# Patient Record
Sex: MALE | Race: BLACK OR AFRICAN AMERICAN | Employment: UNEMPLOYED | URBAN - METROPOLITAN AREA
[De-identification: names, ages, dates, MRNs, and addresses within clinical notes are randomized per-mention and may not be internally consistent; named-entity substitution may affect disease eponyms.]

---

## 2024-03-02 ENCOUNTER — HOSPITAL ENCOUNTER (INPATIENT)
Facility: HOSPITAL | Age: 24
LOS: 4 days | Discharge: HOME | DRG: 885 | End: 2024-03-06
Attending: PSYCHIATRY & NEUROLOGY | Admitting: PSYCHIATRY & NEUROLOGY

## 2024-03-02 ENCOUNTER — HOSPITAL ENCOUNTER (EMERGENCY)
Facility: HOSPITAL | Age: 24
Discharge: ANOTHER HOSPITAL | End: 2024-03-02
Attending: EMERGENCY MEDICINE

## 2024-03-02 VITALS
DIASTOLIC BLOOD PRESSURE: 90 MMHG | HEIGHT: 67 IN | HEART RATE: 57 BPM | TEMPERATURE: 97.7 F | SYSTOLIC BLOOD PRESSURE: 135 MMHG | WEIGHT: 119.05 LBS | BODY MASS INDEX: 18.69 KG/M2 | RESPIRATION RATE: 16 BRPM | OXYGEN SATURATION: 100 %

## 2024-03-02 DIAGNOSIS — F48.9 MENTAL HEALTH PROBLEM: Primary | ICD-10-CM

## 2024-03-02 PROBLEM — F31.9 BIPOLAR DISORDER (HCC): Status: ACTIVE | Noted: 2024-03-02

## 2024-03-02 LAB
ALBUMIN SERPL-MCNC: 4.3 G/DL (ref 3.5–5)
ALBUMIN/GLOB SERPL: 1.2 (ref 1.1–2.2)
ALP SERPL-CCNC: 110 U/L (ref 45–117)
ALT SERPL-CCNC: 22 U/L (ref 12–78)
AMPHET UR QL SCN: NEGATIVE
ANION GAP SERPL CALC-SCNC: 6 MMOL/L (ref 5–15)
APAP SERPL-MCNC: <2 UG/ML (ref 10–30)
APPEARANCE UR: CLEAR
AST SERPL-CCNC: 22 U/L (ref 15–37)
BACTERIA URNS QL MICRO: NEGATIVE /HPF
BARBITURATES UR QL SCN: NEGATIVE
BASOPHILS # BLD: 0 K/UL (ref 0–0.1)
BASOPHILS NFR BLD: 1 % (ref 0–1)
BENZODIAZ UR QL: NEGATIVE
BILIRUB SERPL-MCNC: 0.8 MG/DL (ref 0.2–1)
BILIRUB UR QL: NEGATIVE
BUN SERPL-MCNC: 6 MG/DL (ref 6–20)
BUN/CREAT SERPL: 8 (ref 12–20)
CALCIUM SERPL-MCNC: 9.5 MG/DL (ref 8.5–10.1)
CANNABINOIDS UR QL SCN: POSITIVE
CHLORIDE SERPL-SCNC: 101 MMOL/L (ref 97–108)
CO2 SERPL-SCNC: 28 MMOL/L (ref 21–32)
COCAINE UR QL SCN: NEGATIVE
COLOR UR: NORMAL
CREAT SERPL-MCNC: 0.79 MG/DL (ref 0.7–1.3)
DIFFERENTIAL METHOD BLD: NORMAL
EOSINOPHIL # BLD: 0 K/UL (ref 0–0.4)
EOSINOPHIL NFR BLD: 1 % (ref 0–7)
EPITH CASTS URNS QL MICRO: NORMAL /LPF
ERYTHROCYTE [DISTWIDTH] IN BLOOD BY AUTOMATED COUNT: 12.4 % (ref 11.5–14.5)
ETHANOL SERPL-MCNC: <10 MG/DL (ref 0–0.08)
FLUAV RNA SPEC QL NAA+PROBE: NOT DETECTED
FLUBV RNA SPEC QL NAA+PROBE: NOT DETECTED
GLOBULIN SER CALC-MCNC: 3.7 G/DL (ref 2–4)
GLUCOSE SERPL-MCNC: 76 MG/DL (ref 65–100)
GLUCOSE UR STRIP.AUTO-MCNC: NEGATIVE MG/DL
HCT VFR BLD AUTO: 39.7 % (ref 36.6–50.3)
HGB BLD-MCNC: 13.3 G/DL (ref 12.1–17)
HGB UR QL STRIP: NEGATIVE
HYALINE CASTS URNS QL MICRO: NORMAL /LPF (ref 0–2)
IMM GRANULOCYTES # BLD AUTO: 0 K/UL (ref 0–0.04)
IMM GRANULOCYTES NFR BLD AUTO: 0 % (ref 0–0.5)
KETONES UR QL STRIP.AUTO: NEGATIVE MG/DL
LEUKOCYTE ESTERASE UR QL STRIP.AUTO: NEGATIVE
LYMPHOCYTES # BLD: 1.4 K/UL (ref 0.8–3.5)
LYMPHOCYTES NFR BLD: 19 % (ref 12–49)
Lab: ABNORMAL
MCH RBC QN AUTO: 29.2 PG (ref 26–34)
MCHC RBC AUTO-ENTMCNC: 33.5 G/DL (ref 30–36.5)
MCV RBC AUTO: 87.3 FL (ref 80–99)
METHADONE UR QL: NEGATIVE
MONOCYTES # BLD: 1 K/UL (ref 0–1)
MONOCYTES NFR BLD: 13 % (ref 5–13)
NEUTS SEG # BLD: 5.1 K/UL (ref 1.8–8)
NEUTS SEG NFR BLD: 66 % (ref 32–75)
NITRITE UR QL STRIP.AUTO: NEGATIVE
NRBC # BLD: 0 K/UL (ref 0–0.01)
NRBC BLD-RTO: 0 PER 100 WBC
OPIATES UR QL: NEGATIVE
PCP UR QL: NEGATIVE
PH UR STRIP: 7 (ref 5–8)
PLATELET # BLD AUTO: 370 K/UL (ref 150–400)
PMV BLD AUTO: 8.9 FL (ref 8.9–12.9)
POTASSIUM SERPL-SCNC: 4 MMOL/L (ref 3.5–5.1)
PROT SERPL-MCNC: 8 G/DL (ref 6.4–8.2)
PROT UR STRIP-MCNC: NEGATIVE MG/DL
RBC # BLD AUTO: 4.55 M/UL (ref 4.1–5.7)
RBC #/AREA URNS HPF: NORMAL /HPF (ref 0–5)
SALICYLATES SERPL-MCNC: <1.7 MG/DL (ref 2.8–20)
SARS-COV-2 RNA RESP QL NAA+PROBE: NOT DETECTED
SODIUM SERPL-SCNC: 135 MMOL/L (ref 136–145)
SP GR UR REFRACTOMETRY: <1.005
URINE CULTURE IF INDICATED: NORMAL
UROBILINOGEN UR QL STRIP.AUTO: 1 EU/DL (ref 0.2–1)
WBC # BLD AUTO: 7.7 K/UL (ref 4.1–11.1)
WBC URNS QL MICRO: NORMAL /HPF (ref 0–4)

## 2024-03-02 PROCEDURE — 36415 COLL VENOUS BLD VENIPUNCTURE: CPT

## 2024-03-02 PROCEDURE — 80307 DRUG TEST PRSMV CHEM ANLYZR: CPT

## 2024-03-02 PROCEDURE — 80143 DRUG ASSAY ACETAMINOPHEN: CPT

## 2024-03-02 PROCEDURE — 99285 EMERGENCY DEPT VISIT HI MDM: CPT

## 2024-03-02 PROCEDURE — 81001 URINALYSIS AUTO W/SCOPE: CPT

## 2024-03-02 PROCEDURE — 87636 SARSCOV2 & INF A&B AMP PRB: CPT

## 2024-03-02 PROCEDURE — 85025 COMPLETE CBC W/AUTO DIFF WBC: CPT

## 2024-03-02 PROCEDURE — 6370000000 HC RX 637 (ALT 250 FOR IP)

## 2024-03-02 PROCEDURE — 80179 DRUG ASSAY SALICYLATE: CPT

## 2024-03-02 PROCEDURE — 80053 COMPREHEN METABOLIC PANEL: CPT

## 2024-03-02 PROCEDURE — 82077 ASSAY SPEC XCP UR&BREATH IA: CPT

## 2024-03-02 PROCEDURE — 1240000000 HC EMOTIONAL WELLNESS R&B

## 2024-03-02 PROCEDURE — 90791 PSYCH DIAGNOSTIC EVALUATION: CPT

## 2024-03-02 RX ORDER — ACETAMINOPHEN 325 MG/1
650 TABLET ORAL EVERY 4 HOURS PRN
Status: DISCONTINUED | OUTPATIENT
Start: 2024-03-02 | End: 2024-03-06 | Stop reason: HOSPADM

## 2024-03-02 RX ORDER — DIPHENHYDRAMINE HYDROCHLORIDE 50 MG/ML
50 INJECTION INTRAMUSCULAR; INTRAVENOUS EVERY 4 HOURS PRN
Status: DISCONTINUED | OUTPATIENT
Start: 2024-03-02 | End: 2024-03-06 | Stop reason: HOSPADM

## 2024-03-02 RX ORDER — POLYETHYLENE GLYCOL 3350 17 G
2 POWDER IN PACKET (EA) ORAL
Status: DISCONTINUED | OUTPATIENT
Start: 2024-03-02 | End: 2024-03-06 | Stop reason: HOSPADM

## 2024-03-02 RX ORDER — NICOTINE 21 MG/24HR
1 PATCH, TRANSDERMAL 24 HOURS TRANSDERMAL ONCE
Status: DISCONTINUED | OUTPATIENT
Start: 2024-03-02 | End: 2024-03-02 | Stop reason: HOSPADM

## 2024-03-02 RX ORDER — POLYETHYLENE GLYCOL 3350 17 G/17G
17 POWDER, FOR SOLUTION ORAL DAILY PRN
Status: DISCONTINUED | OUTPATIENT
Start: 2024-03-02 | End: 2024-03-06 | Stop reason: HOSPADM

## 2024-03-02 RX ORDER — HALOPERIDOL 5 MG/ML
5 INJECTION INTRAMUSCULAR EVERY 4 HOURS PRN
Status: DISCONTINUED | OUTPATIENT
Start: 2024-03-02 | End: 2024-03-06 | Stop reason: HOSPADM

## 2024-03-02 RX ORDER — MAGNESIUM HYDROXIDE/ALUMINUM HYDROXICE/SIMETHICONE 120; 1200; 1200 MG/30ML; MG/30ML; MG/30ML
30 SUSPENSION ORAL EVERY 6 HOURS PRN
Status: DISCONTINUED | OUTPATIENT
Start: 2024-03-02 | End: 2024-03-06 | Stop reason: HOSPADM

## 2024-03-02 RX ORDER — TRAZODONE HYDROCHLORIDE 50 MG/1
50 TABLET ORAL NIGHTLY PRN
Status: DISCONTINUED | OUTPATIENT
Start: 2024-03-02 | End: 2024-03-06 | Stop reason: HOSPADM

## 2024-03-02 RX ORDER — NICOTINE 21 MG/24HR
1 PATCH, TRANSDERMAL 24 HOURS TRANSDERMAL DAILY
Status: DISCONTINUED | OUTPATIENT
Start: 2024-03-03 | End: 2024-03-06 | Stop reason: HOSPADM

## 2024-03-02 RX ORDER — SENNOSIDES A AND B 8.6 MG/1
1 TABLET, FILM COATED ORAL DAILY PRN
Status: DISCONTINUED | OUTPATIENT
Start: 2024-03-02 | End: 2024-03-06 | Stop reason: HOSPADM

## 2024-03-02 RX ORDER — HALOPERIDOL 5 MG/1
5 TABLET ORAL EVERY 4 HOURS PRN
Status: DISCONTINUED | OUTPATIENT
Start: 2024-03-02 | End: 2024-03-06 | Stop reason: HOSPADM

## 2024-03-02 RX ORDER — HYDROXYZINE 50 MG/1
50 TABLET, FILM COATED ORAL 3 TIMES DAILY PRN
Status: DISCONTINUED | OUTPATIENT
Start: 2024-03-02 | End: 2024-03-06 | Stop reason: HOSPADM

## 2024-03-02 RX ADMIN — TRAZODONE HYDROCHLORIDE 50 MG: 50 TABLET ORAL at 21:44

## 2024-03-02 ASSESSMENT — LIFESTYLE VARIABLES
HOW MANY STANDARD DRINKS CONTAINING ALCOHOL DO YOU HAVE ON A TYPICAL DAY: 1 OR 2
HOW OFTEN DO YOU HAVE A DRINK CONTAINING ALCOHOL: 2-4 TIMES A MONTH
HOW OFTEN DO YOU HAVE A DRINK CONTAINING ALCOHOL: 2-4 TIMES A MONTH
HOW MANY STANDARD DRINKS CONTAINING ALCOHOL DO YOU HAVE ON A TYPICAL DAY: 1 OR 2

## 2024-03-02 ASSESSMENT — PAIN - FUNCTIONAL ASSESSMENT: PAIN_FUNCTIONAL_ASSESSMENT: NONE - DENIES PAIN

## 2024-03-02 ASSESSMENT — SLEEP AND FATIGUE QUESTIONNAIRES
AVERAGE NUMBER OF SLEEP HOURS: 5
DO YOU USE A SLEEP AID: NO
SLEEP PATTERN: INSOMNIA
DO YOU HAVE DIFFICULTY SLEEPING: YES

## 2024-03-02 NOTE — BSMART NOTE
Marcell has been accepted to [facility] (Acute) Room 728-1. Accepting is [facility] for Dr. Turk. Nursing report x [phone].

## 2024-03-02 NOTE — ED NOTES
Pt shoes, hoodie, hair ties, pants, watch, cell phone, vape, and lighter placed in belongings bag. Pt in hospital paper scrubs. Pt wanded by security. Constant observer at bedside.

## 2024-03-02 NOTE — ED NOTES
Patient arrives with Mom and grandpa. Per Mom, patient does not take his prescribed medications regularly. Patient takes clonidine, fluoxetine and atarax and has not taken them in 4 days. Mom reports difficulty sleeping the last 4 days. Patient denies pain, unwilling to speak about symptoms at this time. Hx PTSD< bipolar, ADHD per Mom. When asked about suicidal/homicidal ideation, patient states, \"sometimes.\" Patient refusing to answer questions about plan or intent  at this time.

## 2024-03-02 NOTE — ED NOTES
Pt reports intermittent thougths of harming other and self but denies having a plan. Pt states he is willing to cooperated and he used to cut  but now her doesn't. He said he doesn't currently have thoughts to hurt self or others but feels somewhat restless. Pt continues to sit in room and do google searches on computer about different illnesses. Pt also states some Marijuana use but denies drugs and alcohol.

## 2024-03-02 NOTE — BSMART NOTE
Comprehensive Assessment Form Part 1      Section I - Disposition    Primary Diagnosis:   Bipolar II Disorder, current episode hypomanic    R/O Substance Induced Psychosis  R/O Personality Disorder    The Medical Doctor to Psychiatrist conference was not completed.  The Medical Doctor is in agreement with Psychiatrist disposition because of (reason) PILI.  The plan is present for voluntary admission.  The on-call Psychiatrist consulted was Dr. ALEJANDRE.  The admitting Psychiatrist will be Dr. BONILLA.  The admitting Diagnosis is TBD.  The Payor source is Medicaid.   Based on the CSSRS, there is moderate risk for suicide. Based on this assessment, the overall risk for suicide is moderate. The plan will be present for voluntary admission.    Section II - Integrated Summary    Summary:  Marcell Us is a 23 y.o. male seen [facility] via telehealth for BSMART assessment. His mother is present in the room with him. Marcell is alert and oriented x 4. He presents as somewhat guarded, evasive, bizarre, and with poor boundaries. He is somewhat uncooperative, challenging this clinician often when being asked questions. He presents with adequate hygiene and is casually dressed sitting in hospital chair. Mood is anxious. Affect is labile. Thought content demonstrates some paranoia and some grandiosity. Thought process show some evidence of flight of ideas.    Per triage: \"Patient arrives with Mom and grandpa. Per Mom, patient does not take his prescribed medications regularly. Patient takes clonidine, fluoxetine and atarax and has not taken them in 4 days. Mom reports difficulty sleeping the last 4 days. Patient denies pain, unwilling to speak about symptoms at this time. Hx PTSD< bipolar, ADHD per Mom. When asked about suicidal/homicidal ideation, patient states, \"sometimes.\" Patient refusing to answer questions about plan or intent at this time.\"    Marcell's mother reported a history of ADHD, PTSD, Bipolar Disorder, and Anxiety, and

## 2024-03-02 NOTE — ED NOTES
1700 Attempted to call report no answer.     [facility] Received new phone number for report.     1844 [facility]        TRANSFER - OUT REPORT:    Verbal report given to Kirsten DAS  on Marcell Us  being transferred to [facility] 728 for routine progression of patient care       Report consisted of patient's Situation, Background, Assessment and   Recommendations(SBAR).     Information from the following report(s) Nurse Handoff Report, Index, ED Encounter Summary, ED SBAR, Intake/Output, and MAR was reviewed with the receiving nurse.    [place] Fall Assessment:    Presents to emergency department  because of falls (Syncope, seizure, or loss of consciousness): No  Age > 70: No  Altered Mental Status, Intoxication with alcohol or substance confusion (Disorientation, impaired judgment, poor safety awaremess, or inability to follow instructions): No  Impaired Mobility: Ambulates or transfers with assistive devices or assistance; Unable to ambulate or transer.: No  Nursing Judgement: No          Lines:       Opportunity for questions and clarification was provided.      Patient transported with:  [facility]    1930 Pt requesting Nicotine patch. Notified Charge RN need for EMTALA completion

## 2024-03-02 NOTE — ED PROVIDER NOTES
[facility] EMERGENCY DEPT  EMERGENCY DEPARTMENT ENCOUNTER       Pt Name: Marcell Us  MRN: [id number]  Birthdate 2000  Date of evaluation: 3/2/2024  Provider: Doreen Willis MD   PCP: No primary care provider on file.  Note Started: 12:16 PM EST 3/2/24     CHIEF COMPLAINT       Chief Complaint   Patient presents with    Mental Health Problem     Pt ambulatory through triage with c/o not sleeping due to medications.  Pt has been on and off his medications according to family.  Pt's mother feels that pt is having a mental health crises.        HISTORY OF PRESENT ILLNESS: 1 or more elements      History From: Patient and mom, History limited by: none     Marcell Us is a 23 y.o. male patient who appears to have a history of ADHD, depression and anxiety, here for mental health problem.  His mom states he has not been sleeping for the last 4 to 5 days.  She states he has not been taking his medications regularly.  Patient denies homicidal or suicidal ideation, but mom thinks he may be having a mental health crisis.  He denies chest pain, shortness of breath, nausea or dizziness.  Denies headache, fever or cough.       Please See MDM for Additional Details of the HPI/PMH  Nursing Notes were all reviewed and agreed with or any disagreements were addressed in the HPI.     REVIEW OF SYSTEMS        Positives and Pertinent negatives as per HPI.    PAST HISTORY     Past Medical History:  No past medical history on file.    Past Surgical History:  No past surgical history on file.    Family History:  No family history on file.    Social History:       Allergies:  Not on File    CURRENT MEDICATIONS      Previous Medications    No medications on file       SCREENINGS               No data recorded         PHYSICAL EXAM      ED Triage Vitals [03/02/24 1121]   Enc Vitals Group      BP (!) 132/90      Pulse 77      Respirations 16      Temp 98.1 °F (36.7 °C)      Temp Source Oral      SpO2 99 %      Weight - Scale 54 kg (119

## 2024-03-03 PROCEDURE — 6370000000 HC RX 637 (ALT 250 FOR IP): Performed by: PSYCHIATRY & NEUROLOGY

## 2024-03-03 PROCEDURE — 6370000000 HC RX 637 (ALT 250 FOR IP)

## 2024-03-03 PROCEDURE — 1240000000 HC EMOTIONAL WELLNESS R&B

## 2024-03-03 PROCEDURE — 93005 ELECTROCARDIOGRAM TRACING: CPT

## 2024-03-03 RX ORDER — OLANZAPINE 5 MG/1
5 TABLET, ORALLY DISINTEGRATING ORAL NIGHTLY
Status: DISCONTINUED | OUTPATIENT
Start: 2024-03-03 | End: 2024-03-06 | Stop reason: HOSPADM

## 2024-03-03 RX ADMIN — ALUMINUM HYDROXIDE, MAGNESIUM HYDROXIDE, AND SIMETHICONE 30 ML: 200; 200; 20 SUSPENSION ORAL at 19:48

## 2024-03-03 RX ADMIN — NICOTINE POLACRILEX 2 MG: 2 LOZENGE ORAL at 19:48

## 2024-03-03 RX ADMIN — NICOTINE POLACRILEX 2 MG: 2 LOZENGE ORAL at 17:07

## 2024-03-03 RX ADMIN — TRAZODONE HYDROCHLORIDE 50 MG: 50 TABLET ORAL at 21:15

## 2024-03-03 RX ADMIN — OLANZAPINE 5 MG: 5 TABLET, ORALLY DISINTEGRATING ORAL at 21:15

## 2024-03-03 ASSESSMENT — PAIN - FUNCTIONAL ASSESSMENT
PAIN_FUNCTIONAL_ASSESSMENT: NONE - DENIES PAIN

## 2024-03-03 NOTE — PLAN OF CARE
Patient received resting in bed with eyes closed. NAD and no complaints noted. Safety measures in place. Will continue to monitor with q15min rounds.  Problem: Safety - Adult  Goal: Free from fall injury  Outcome: Progressing

## 2024-03-03 NOTE — PLAN OF CARE
Problem: Anxiety  Goal: Will report anxiety at manageable levels  Description: INTERVENTIONS:  1. Administer medication as ordered  2. Teach and rehearse alternative coping skills  3. Provide emotional support with 1:1 interaction with staff  Outcome: Progressing       1427: Pt's mother and grandfather are currently visiting in the dining room.     1445: Pt requested to be discharged. Education provided on [facility], pt agreed to stay and speak with treatment team in the morning.

## 2024-03-03 NOTE — H&P
History and Physical    Date of Service:  3/2/2024  Primary Care Provider: No primary care provider on file.  Source of information: The patient and Chart review    Chief Complaint: No chief complaint on file.      History of Presenting Illness:   Marcell Us is a 23 y.o. male with a pmhx of bipolar disorder who presented to [facility]\"" ED accompanied by mother with complaints of not taking medication regularly. Per chart mother stated that the patient has \"..been all over the place\". He was transferred to [facility] for further treatment.   Hospitalist was consulted for H&P.  Seen and examined patient. He is AAO and cooperative throughout exam. He states that he is feeling good, without any medical complaints. Says that he was prescribed medications but recently found out that he is \"not crazy\" so he decided to stop taking those meds.   Sometimes has lower abdominal pain that normally happens when anxiety is high and he describes it as period cramps. At times, feels a \"popping\" in his lungs if he takes a deep breath and he is concerned that he has cancer because of it   He notes that he is \"tongue-tied\" so when he feels like he is \"talking funny\" he will try to \"release the tongue tied part in the mouth\". States that he cuts it. He's concerned if he see a provider about it, his insurance won't pay because it would be considered cosmetic.   Current every day smoker, has smoked since 9 years old. Smokes marijuana and drinks alcohol \"sometimes\" but decided that he was going to quit because he is \"not crazy\".   At time of examination, he denies any syncope, dizziness, chest pain or tightness, nausea, vomiting or diarrhea.        REVIEW OF SYSTEMS:  A comprehensive review of systems was negative except for that written in the History of Present Illness.     No past medical history on file.   No past surgical history on file.  Prior to Admission medications    Not on File     No Known Allergies   No family history on

## 2024-03-04 LAB
EKG ATRIAL RATE: 60 BPM
EKG DIAGNOSIS: NORMAL
EKG P AXIS: 65 DEGREES
EKG P-R INTERVAL: 140 MS
EKG Q-T INTERVAL: 366 MS
EKG QRS DURATION: 86 MS
EKG QTC CALCULATION (BAZETT): 366 MS
EKG R AXIS: 68 DEGREES
EKG T AXIS: 55 DEGREES
EKG VENTRICULAR RATE: 60 BPM

## 2024-03-04 PROCEDURE — 6370000000 HC RX 637 (ALT 250 FOR IP): Performed by: PSYCHIATRY & NEUROLOGY

## 2024-03-04 PROCEDURE — 6370000000 HC RX 637 (ALT 250 FOR IP)

## 2024-03-04 PROCEDURE — 1240000000 HC EMOTIONAL WELLNESS R&B

## 2024-03-04 RX ORDER — LOPERAMIDE HYDROCHLORIDE 2 MG/1
2 CAPSULE ORAL 4 TIMES DAILY PRN
Status: DISCONTINUED | OUTPATIENT
Start: 2024-03-04 | End: 2024-03-06 | Stop reason: HOSPADM

## 2024-03-04 RX ORDER — ONDANSETRON 4 MG/1
4 TABLET, ORALLY DISINTEGRATING ORAL EVERY 8 HOURS PRN
Status: DISCONTINUED | OUTPATIENT
Start: 2024-03-04 | End: 2024-03-06 | Stop reason: HOSPADM

## 2024-03-04 RX ADMIN — OLANZAPINE 5 MG: 5 TABLET, ORALLY DISINTEGRATING ORAL at 21:16

## 2024-03-04 RX ADMIN — ONDANSETRON 4 MG: 4 TABLET, ORALLY DISINTEGRATING ORAL at 21:16

## 2024-03-04 RX ADMIN — TRAZODONE HYDROCHLORIDE 50 MG: 50 TABLET ORAL at 23:07

## 2024-03-04 RX ADMIN — NICOTINE POLACRILEX 2 MG: 2 LOZENGE ORAL at 21:31

## 2024-03-04 RX ADMIN — LOPERAMIDE HYDROCHLORIDE 2 MG: 2 CAPSULE ORAL at 10:31

## 2024-03-04 RX ADMIN — ONDANSETRON 4 MG: 4 TABLET, ORALLY DISINTEGRATING ORAL at 10:31

## 2024-03-04 NOTE — PLAN OF CARE
Problem: Safety - Adult  Goal: Free from fall injury  3/4/2024 0807 by Julisa Yadav RN  Outcome: Progressing     Problem: Anxiety  Goal: Will report anxiety at manageable levels  Description: INTERVENTIONS:  1. Administer medication as ordered  2. Teach and rehearse alternative coping skills  3. Provide emotional support with 1:1 interaction with staff  Outcome: Progressing

## 2024-03-04 NOTE — INTERDISCIPLINARY ROUNDS
Behavioral Health Interdisciplinary Rounds     Patient Name: Marcell Us  Age: 23 y.o.  Room/Bed:  [facility]/  Primary Diagnosis: Bipolar disorder (HCC)   Admission Status: Voluntary    Readmission within 30 days: No  Power of  in place: No  Patient requires a blocked bed: Yes         Reason for blocked bed: Paranoia  Sleep hours: 7       Participation in Care/Groups:  Yes  Medication Compliant?: Yes  PRNS (last 24 hours): Sleep Aid   Restraints (last 24 hours):  No  __________________________________________________  OQ Admission Analysis Survey completed:no  OQ Admission Analysis Survey score:  __________________________________________________     Alcohol screening (AUDIT) completed :  yes - Score 2   Tobacco - patient is a smoker:    Illegal Drugs use:      24 hour chart check complete: Yes    _______________________________________________    Patient goal(s) for today: meet with treatment team   Treatment team focus/goals: Plan to continue to titrate his medications.    Progress note:    He denies SI , denies AH or VH , complains of stomach upset , he has been compliant with his medications and has been sleeping at night,   Spiritual Care Consult: no   Financial concerns/prescription coverage:    Family contact:      He signed a ALFREDO for his mother -  Sonam Us  Parent  [phone]                 Family requesting physician contact today:    Discharge plan: he will return home with family   Access to weapons : no                                                              Outpatient provider(s):  UNRULY King - Phone: [phone]    LOS:  2  Expected LOS: Wednesday     Participating treatment team members: Marcell GIOVANNI Magen, MONICA Gilliam- Dr. Betty Yadav RN

## 2024-03-04 NOTE — CARE COORDINATION
03/04/24 0928   ITP   Date of Plan 03/04/24   Date of Next Review 03/11/24   Primary Diagnosis Code Bipolar d/o   Barriers to Treatment Need for psychoeducation;Psychiatric symptom (comment)   Strengths Incorporated in Plan Acknowledging need for assistance   Plan of Care   Long Term Goal (LTG) Stated in patient/guardian terms Participate in outpt counseling and medication management 12 wks   Short Term Goal 1   Short Term Goal 1 Client will be oriented to program and staff, and participate in assessment process   Baseline Functioning able to verbalize needs   Target 1 week   Objectives Client will participate in group therapy   Intervention 1 Acknowledge client strengths   Frequency daily   Measured by Staff observation;Self report;Behavioral data   Staff Responsible Clinical staff   Intervention 2 Group therapy   Frequency daily   Measured by Staff observation;Self report   Staff Responsible Clinical staff   Intervention 3 Monitor medications   Frequency daily   Measured by Staff observation;Behavioral data   Staff Responsible Clinical staff   STG Goal 1 Status: Patient Appears to be  Progressing toward treatment plan goal   Crisis/Safety/Discharge Plan   Crisis/Safety Plan Standard program interventions and protocol   Comprehensive Assessment Completion Date 03/04/24   Discharge Plan Pt to discharge home with follow up care

## 2024-03-05 PROCEDURE — 6370000000 HC RX 637 (ALT 250 FOR IP): Performed by: PSYCHIATRY & NEUROLOGY

## 2024-03-05 PROCEDURE — 1240000000 HC EMOTIONAL WELLNESS R&B

## 2024-03-05 PROCEDURE — 6370000000 HC RX 637 (ALT 250 FOR IP)

## 2024-03-05 RX ORDER — FLUOXETINE HYDROCHLORIDE 20 MG/1
20 CAPSULE ORAL EVERY EVENING
Status: ON HOLD | COMMUNITY
End: 2024-03-06 | Stop reason: HOSPADM

## 2024-03-05 RX ORDER — CLONIDINE HYDROCHLORIDE 0.1 MG/1
0.1 TABLET ORAL EVERY 4 HOURS PRN
Status: DISCONTINUED | OUTPATIENT
Start: 2024-03-05 | End: 2024-03-06 | Stop reason: HOSPADM

## 2024-03-05 RX ORDER — CLONIDINE HYDROCHLORIDE 0.2 MG/1
0.2 TABLET ORAL
Status: ON HOLD | COMMUNITY
End: 2024-03-06

## 2024-03-05 RX ORDER — HYDROXYZINE PAMOATE 25 MG/1
25 CAPSULE ORAL 2 TIMES DAILY PRN
Status: ON HOLD | COMMUNITY
End: 2024-03-06 | Stop reason: HOSPADM

## 2024-03-05 RX ADMIN — CLONIDINE HYDROCHLORIDE 0.1 MG: 0.1 TABLET ORAL at 16:04

## 2024-03-05 RX ADMIN — CLONIDINE HYDROCHLORIDE 0.1 MG: 0.1 TABLET ORAL at 20:12

## 2024-03-05 RX ADMIN — OLANZAPINE 5 MG: 5 TABLET, ORALLY DISINTEGRATING ORAL at 20:12

## 2024-03-05 NOTE — PLAN OF CARE
Problem: Safety - Adult  Goal: Free from fall injury  3/5/2024 0805 by Rosita Rosales, RN  Outcome: Progressing  3/5/2024 0101 by Frances Russell RN  Outcome: Progressing  Flowsheets (Taken 3/5/2024 0101)  Free From Fall Injury:   Instruct family/caregiver on patient safety   Based on caregiver fall risk screen, instruct family/caregiver to ask for assistance with transferring infant if caregiver noted to have fall risk factors     Problem: Anxiety  Goal: Will report anxiety at manageable levels  Description: INTERVENTIONS:  1. Administer medication as ordered  2. Teach and rehearse alternative coping skills  3. Provide emotional support with 1:1 interaction with staff  3/5/2024 0805 by Rosita Rosales RN  Outcome: Progressing  Flowsheets (Taken 3/5/2024 0759)  Will report anxiety at manageable levels: Provide emotional support with 1:1 interaction with staff  3/5/2024 0101 by Frances Russell RN  Outcome: Progressing  Flowsheets (Taken 3/5/2024 0101)  Will report anxiety at manageable levels:   Provide emotional support with 1:1 interaction with staff   Administer medication as ordered   Teach and rehearse alternative coping skills

## 2024-03-05 NOTE — PLAN OF CARE
Problem: Safety - Adult  Goal: Free from fall injury  Outcome: Progressing  Flowsheets (Taken 3/5/2024 0101)  Free From Fall Injury:   Instruct family/caregiver on patient safety   Based on caregiver fall risk screen, instruct family/caregiver to ask for assistance with transferring infant if caregiver noted to have fall risk factors     Problem: Anxiety  Goal: Will report anxiety at manageable levels  3/5/2024 0101 by Frances Russell, RN  Outcome: Progressing  Flowsheets (Taken 3/5/2024 0101)  Will report anxiety at manageable levels:   Provide emotional support with 1:1 interaction with staff   Administer medication as ordered   Teach and rehearse alternative coping skills  3/4/2024 0102 by Frances RN  Outcome: Progressing

## 2024-03-05 NOTE — INTERDISCIPLINARY ROUNDS
Behavioral Health Interdisciplinary Rounds     Patient Name: Marcell Us  Age: 23 y.o.  Room/Bed:  [facility]/  Primary Diagnosis: Bipolar disorder (HCC)   Admission Status: Voluntary    Readmission within 30 days: No  Power of  in place: No  Patient requires a blocked bed: Yes          Reason for blocked bed: behavior  Sleep hours: 7+       Participation in Care/Groups:  Yes  Medication Compliant?: Yes  PRNS (last 24 hours): None   Restraints (last 24 hours):  No  ___________________________________  24 hour chart check complete: Yes    _______________________________________________    Patient goal(s) for today: meet with treatment team   Treatment team focus/goals: plan to set up for discharge on Wednesday   Progress note: He denies issues with his medications , sleeping well, Ensures d/c due to GI distress      Spiritual Care Consult: no   Financial concerns/prescription coverage:  ? Insurance   Family contact:     mother                    Family requesting physician contact today:    Discharge plan: he will return home with his mother   Access to weapons : no                                                              Outpatient provider(s): [facility]     LOS:  3  Expected LOS: Wednesday     Participating treatment team members: Marcell Us, MONICA Gilliam- Dr. Betty Yadav RN

## 2024-03-06 ENCOUNTER — HOSPITAL ENCOUNTER (EMERGENCY)
Facility: HOSPITAL | Age: 24
Discharge: HOME | End: 2024-03-07
Attending: EMERGENCY MEDICINE

## 2024-03-06 VITALS
HEART RATE: 90 BPM | RESPIRATION RATE: 18 BRPM | OXYGEN SATURATION: 100 % | BODY MASS INDEX: 18.69 KG/M2 | WEIGHT: 119.05 LBS | SYSTOLIC BLOOD PRESSURE: 132 MMHG | HEIGHT: 67 IN | DIASTOLIC BLOOD PRESSURE: 107 MMHG | TEMPERATURE: 99 F

## 2024-03-06 DIAGNOSIS — R10.33 RECURRENT PERIUMBILICAL ABDOMINAL PAIN: Primary | ICD-10-CM

## 2024-03-06 DIAGNOSIS — R14.0 GASEOUS ABDOMINAL DISTENTION: ICD-10-CM

## 2024-03-06 LAB
CHOLEST SERPL-MCNC: 167 MG/DL
EST. AVERAGE GLUCOSE BLD GHB EST-MCNC: 94 MG/DL
HBA1C MFR BLD: 4.9 % (ref 4–5.6)
HDLC SERPL-MCNC: 62 MG/DL
LDLC SERPL CALC-MCNC: 92.2 MG/DL (ref 0–100)
TRIGL SERPL-MCNC: 64 MG/DL
VLDLC SERPL CALC-MCNC: 12.8 MG/DL

## 2024-03-06 PROCEDURE — 6370000000 HC RX 637 (ALT 250 FOR IP)

## 2024-03-06 PROCEDURE — 83690 ASSAY OF LIPASE: CPT

## 2024-03-06 PROCEDURE — 36415 COLL VENOUS BLD VENIPUNCTURE: CPT

## 2024-03-06 PROCEDURE — 99284 EMERGENCY DEPT VISIT MOD MDM: CPT

## 2024-03-06 PROCEDURE — 80307 DRUG TEST PRSMV CHEM ANLYZR: CPT

## 2024-03-06 PROCEDURE — 80061 LIPID PANEL: CPT

## 2024-03-06 PROCEDURE — 6370000000 HC RX 637 (ALT 250 FOR IP): Performed by: PSYCHIATRY & NEUROLOGY

## 2024-03-06 PROCEDURE — 85025 COMPLETE CBC W/AUTO DIFF WBC: CPT

## 2024-03-06 PROCEDURE — 83036 HEMOGLOBIN GLYCOSYLATED A1C: CPT

## 2024-03-06 PROCEDURE — 80053 COMPREHEN METABOLIC PANEL: CPT

## 2024-03-06 PROCEDURE — 81001 URINALYSIS AUTO W/SCOPE: CPT

## 2024-03-06 RX ORDER — TRAZODONE HYDROCHLORIDE 50 MG/1
50 TABLET ORAL NIGHTLY PRN
Qty: 30 TABLET | Refills: 0 | Status: SHIPPED | OUTPATIENT
Start: 2024-03-06 | End: 2024-04-05

## 2024-03-06 RX ORDER — DROPERIDOL 2.5 MG/ML
0.62 INJECTION, SOLUTION INTRAMUSCULAR; INTRAVENOUS ONCE
Status: COMPLETED | OUTPATIENT
Start: 2024-03-06 | End: 2024-03-07

## 2024-03-06 RX ORDER — OLANZAPINE 5 MG/1
5 TABLET, ORALLY DISINTEGRATING ORAL NIGHTLY
Qty: 30 TABLET | Refills: 0 | Status: SHIPPED | OUTPATIENT
Start: 2024-03-06 | End: 2024-04-05

## 2024-03-06 RX ORDER — CLONIDINE HYDROCHLORIDE 0.2 MG/1
0.2 TABLET ORAL
Qty: 30 TABLET | Refills: 0 | Status: SHIPPED | OUTPATIENT
Start: 2024-03-06 | End: 2024-04-05

## 2024-03-06 RX ORDER — 0.9 % SODIUM CHLORIDE 0.9 %
1000 INTRAVENOUS SOLUTION INTRAVENOUS ONCE
Status: COMPLETED | OUTPATIENT
Start: 2024-03-06 | End: 2024-03-07

## 2024-03-06 RX ORDER — NICOTINE 21 MG/24HR
1 PATCH, TRANSDERMAL 24 HOURS TRANSDERMAL DAILY
Qty: 30 PATCH | Refills: 0 | Status: SHIPPED | OUTPATIENT
Start: 2024-03-07 | End: 2024-04-06

## 2024-03-06 RX ADMIN — SENNOSIDES 8.6 MG: 8.6 TABLET, FILM COATED ORAL at 09:18

## 2024-03-06 RX ADMIN — CLONIDINE HYDROCHLORIDE 0.1 MG: 0.1 TABLET ORAL at 09:18

## 2024-03-06 ASSESSMENT — PAIN SCALES - GENERAL: PAINLEVEL_OUTOF10: 7

## 2024-03-06 ASSESSMENT — PAIN - FUNCTIONAL ASSESSMENT: PAIN_FUNCTIONAL_ASSESSMENT: 0-10

## 2024-03-06 ASSESSMENT — PAIN DESCRIPTION - LOCATION: LOCATION: ABDOMEN;GENERALIZED

## 2024-03-06 ASSESSMENT — PAIN DESCRIPTION - DESCRIPTORS: DESCRIPTORS: ACHING;CRAMPING

## 2024-03-06 NOTE — INTERDISCIPLINARY ROUNDS
Behavioral Health Interdisciplinary Rounds     Patient Name: Marcell Us  Age: 23 y.o.  Room/Bed:  [facility]/  Primary Diagnosis: Bipolar disorder (HCC)   Admission Status: Voluntary    Readmission within 30 days: No  Power of  in place: No  Patient requires a blocked bed: No          Reason for blocked bed:   Sleep hours:        Participation in Care/Groups:  Yes  Medication Compliant?: Yes  PRNS (last 24 hours): clonidine   Restraints (last 24 hours):  No  ___________________________________  24 hour chart check complete: Yes    _______________________________________________    Patient goal(s) for today: discharge   Treatment team focus/goals: plan for discharge today   Progress note: He denies SI - denies any issues with his medications or treatment,      Financial concerns/prescription coverage:  no benefits at this time   Family contact:                        Family requesting physician contact today:    Discharge plan: he will return home   Access to weapons : no                                                             Outpatient provider(s): refer to  [facility]   /       LOS:  4  Expected LOS: today     Participating treatment team members: Marcell Us, CHAITANYA Gilliam Dr., RN

## 2024-03-06 NOTE — FLOWSHEET NOTE
03/06/24 0710   Vital Signs   Temp 99 °F (37.2 °C)   Temp Source Oral   Pulse 88   Heart Rate Source Monitor   Respirations 18   BP (!) 134/96   MAP (Calculated) 109   MAP (mmHg) 105   BP Location Left upper arm   Patient Position Sitting   Oxygen Therapy   SpO2 100 %   O2 Device None (Room air)     PRN clonidine given for /96.

## 2024-03-06 NOTE — DISCHARGE INSTRUCTIONS
DISCHARGE SUMMARY    NAME:Marcell Us  : 2000  MRN: [id number]    The patient Marcell Us exhibits the ability to control behavior in a less restrictive environment.  Patient's level of functioning is improving.  No assaultive/destructive behavior has been observed for the past 24 hours.  No suicidal/homicidal threat or behavior has been observed for the past 24 hours.  There is no evidence of serious medication side effects.  Patient has not been in physical or protective restraints for at least the past 24 hours.    If weapons involved, how are they secured? No weapons involved     Is patient aware of and in agreement with discharge plan? He is aware of discharge and is in agreement     Arrangements for medication:  Prescriptions filled at [facility] for a 30 day supply     Copy of discharge instructions to provider?:  yes ,  FAX: [phone]     Arrangements for transportation home:      Keep all follow up appointments as scheduled, continue to take prescribed medications per physician instructions.  Mental health crisis number:  911 or your local mental health crisis line number at [phone]       Mental Health Emergency WARM LINE      [phone] (6428)      M-F: 9am to 9pm      Sat & Sun: 5pm - 9pm  National suicide prevention lines:                             1-677-SKJLJVN (1-464.429.3857)       5-882-256-TALK (1-777.237.9777)    Crisis Text Line:  Text HOME to 143399      BEHAVIORAL HEALTH NURSING DISCHARGE NOTE        PATIENT INSTRUCTIONS:    What to do at Home      You may resume normal activities until       Recommended diet: regular diet    Recommended activity: activity as tolerated    The discharge information has been reviewed with the patient.  The patient verbalized understanding.     [Negative] : Endocrine

## 2024-03-06 NOTE — DISCHARGE SUMMARY
Prior to Admission   No medications prior to admission.     Allergies:  Patient has no known allergies.     Social History:  Supportive family     Family History:   Family History   No family history on file.     Psychiatric Family History  Patient is unable to give family history at this time.     REVIEW OF SYSTEMS:  Review of systems not obtained due to patient factors - mental status     PHYSICAL EXAM:     Vitals:  BP (!) 137/94   Pulse 79   Temp 97.9 °F (36.6 °C) (Oral)   Resp 14   Ht 1.7 m (5' 6.93\")   Wt 54 kg (119 lb 0.8 oz)   SpO2 100%   BMI 18.69 kg/m²      Mental Status Examination:  Level of consciousness:  somnolent           DATA:  Old records have not been requested.     DSM-IV DIAGNOSIS:     Impression    (Axis I):        Bipolar disorder unspecified, mre mixed  R/o substance induced psychosis     Axis II:     Coping Skills:  deferred     Axis III:     See Medical History     Stressors (Axis IV):     Severity of stressors is moderate     Source of stressors:  problems with primary support group     Axis V:     deferred     ASSESSMENT AND PLAN:     Admit to [facility] on voluntary basis  Try to reconcile meds--if unable to will start Zyprexa for mood stabilization until med rec can be verified  Participate in groups and programming  Meet with treatment team daily      Course in the Hospital:   Patient was admitted to the inpatient psychiatry unit for acute psychiatric stabilization in regards to symptomatology as described in the HPI above and placed on Q15 minute checks and withdrawal precautions.   Interval History:  03/04/2024  Nursing report patient has had many questions, otherwise hasn't been any behavioral issues.  Autumn says that he had many questions about his stay.  He asks about his brother. No SI. No HI. No AVH  No melvi or psychosis.     03/04/2024  Nursing report patient has been sleeping well and eating well. He's accepted his medications.  He has been writing some bizarre notes, which has

## 2024-03-06 NOTE — BH NOTE
4 Eyes Skin Assessment     NAME:  Marcell Us  YOB: 2000  MEDICAL RECORD NUMBER:  [id number]    The patient is being assessed for  Admission    I agree that at least one RN has performed a thorough Head to Toe Skin Assessment on the patient. ALL assessment sites listed below have been assessed.      Areas assessed by both nurses:    Head, Face, Ears, Shoulders, Back, Chest, Arms, Elbows, Hands, Sacrum. Buttock, Coccyx, Ischium, and Legs. Feet and Heels        Does the Patient have a Wound? No noted wound(s)       Geoffrey Prevention initiated by RN: Yes  Wound Care Orders initiated by RN: No    Pressure Injury (Stage 3,4, Unstageable, DTI, NWPT, and Complex wounds) if present, place Wound referral order by RN under : No    New Ostomies, if present place, Ostomy referral order under : No     Nurse 1 eSignature: Electronically signed by Jessika Pena RN on 3/2/24 at 9:26 PM EST    **SHARE this note so that the co-signing nurse can place an eSignature**    Nurse 2 eSignature: Electronically signed by Stephanie Patterson LPN on 3/2/24 at 11:11 PM EST   
[facility]  Admission Note     Admission Type:   Admission Type: Voluntary    Reason for admission:  Reason for Admission: Bipolar Affective Disorder, Hypomanic    PATIENT STRENGTHS: supportive system, willing to seek tx voluntarily       Patient Strengths and Limitations: known strengths listed above, limitations has been non-medication compliant          Addictive Behavior: unknown at this time       Medical Problems:   No past medical history on file.    Status EXAM:  Mental Status and Behavioral Exam  Normal: No  Level of Assistance: Independent/Self  Facial Expression: Elevated  Affect: Congruent  Level of Consciousness: Alert  Frequency of Checks: 4 times per hour, close  Mood:Normal: No  Mood: Anxious  Motor Activity:Normal: No  Motor Activity: Increased  Eye Contact: Fair  Observed Behavior: Cooperative  Sexual Misconduct History: Current - no  Preception: [place] to person, [place] to time, [place] to place, [place] to situation  Attention:Normal: Yes  Thought Processes: Circumstantial  Thought Content:Normal: No  Thought Content: Preoccupations  Depression Symptoms: Feelings of helplessness, Increased irritability, Sleep disturbance  Anxiety Symptoms: Generalized  Kylie Symptoms: Increased energy, Less need to sleep  Hallucinations: None  Delusions: No  Memory:Normal: Yes  Insight and Judgment: No  Insight and Judgment: Poor insight, Poor judgment    Pt admitted with followings belongings:        Valuables sent home withN/A. Valuables placed in safe in security envelope, number:  yes,listed in patient's chart. Patient's home medications were (need to contact pharmacy to verify home medications).  Patient oriented to surroundings and program expectations and copy of patient rights given. Received admission packet:  yes.  Consents reviewed, signed yes. Refused nothing at this time. Patient verbalize understanding:  yes.    Patient education on precautions: yes                   Jessika Pena RN 
Department of Psychiatry  Attending History and Physical - Adult         CHIEF COMPLAINT:  \"mental health crisis\"    History obtained from:  mother, electronic medical record    HISTORY OF PRESENT ILLNESS:          The patient is a 23 y.o. male with significant past medical history of No past medical history on file.   who presents with Marcell Us is a 23 y.o. male patient who appears to have a history of ADHD, depression and anxiety, here for mental health problem.  His mom states he has not been sleeping for the last 4 to 5 days.  She states he has not been taking his medications regularly.  Per his mother he has been diagnosed with bipolar d/o and PTSD. Patient denies homicidal or suicidal ideation, but mom thinks he may be having a mental health crisis.      PSYCHIATRIC HISTORY:      The patient is currently receiving care for the above psychiatric illness.    Past mental health outpatient care includes:  Patient has outpatient prescriber    Past mental health hospitalizations: Patient/EHR uncertain of this history    Lifetime Psychiatric Review of Systems         Kylie or Hypomania:  yes - minimal sleep past 4 days     Panic Attacks:  no     Phobias:  no     Obsessions and Compulsions:  no     Body or Vocal Tics:  no     Hallucinations: no     Delusions:  yes - paranoia per chart    Past psychiatric medications include:  per patient's mother clonidine, fluoxetine, vistaril    Adverse reactions from psychotropic medications:  unknown    Past Medical History:    No past medical history on file.  Past Surgical History:    No past surgical history on file.  Medications Prior to Admission:   No medications prior to admission.  Allergies:  Patient has no known allergies.    Social History:  Supportive family    Family History:   No family history on file.  Psychiatric Family History  Patient is unable to give family history at this time.    REVIEW OF SYSTEMS:  Review of systems not obtained due to patient 
GROUP THERAPY PROGRESS NOTE    Patient is participating in Healthy Living group.    Group time: 30 minutes    Personal goal for participation: To develop an understanding of [facility].    Goal orientation: Personal    Group therapy participation: Passive     Therapeutic interventions reviewed and discussed:  Group members were offered education about [facility]. Members learned about the three aspects of Health, physical, mental, and social. Members were provided with a handout assessment so that they may gain a visual understanding of balanced and unbalanced health maintenance.     Impression of participation: SW provided handouts for pt to work on independently due to lack of group participation.    Katherine Gillies, MSW, [facility]-A    
GROUP THERAPY PROGRESS NOTE    Patient is participating in Safety Planning group.    Group time: 15 minutes    Personal goal for participation: To complete safety plan     Goal orientation: Personal    Group therapy participation: Passive      Therapeutic interventions reviewed and discussed:  Group members were supported in filling out safety plans. Pts are able to develop and document a strategy to remain safe after discharge. SW provided feedback about questions/concerns of pts. Pts returned safety plans when completed.     Impression of participation:  SW provided safety plan to pt to be completed independently.    Katherine Gillies, MSW, [facility]    
GROUP THERAPY PROGRESS NOTE    Patient is participating in psychoeducation group.    Group time: 30 mins.    Personal goal for participation: to develop an understanding of cognitive distortions and how to alter our thinking.     Goal orientation: Personal    Group therapy participation:  Active     Therapeutic interventions reviewed and discussed:  Group members were guided through learning about cognitive distortions through discussion and video. Members gained an understanding of how these types of distortions effect perception, relationships, and overall mental health. Members were guided through learning about methods that can be used for changing negative thought patterns. Handouts provided.    Impression of participation:  Pt was present and engaged in group discussion     Katherine Gillies, MSW, [facility]-A    
GROUP THERAPY PROGRESS NOTE    Unable to hold groups due to unit acuity as per Tech/Nurse. SW provided pts with various handouts to complete independently.     Katherine Gillies, MSW, [facility]  
GROUP THERAPY PROGRESS NOTE  No group due to Low patient participation. SW provided pts with activities to complete independently.     Katherine Gillies, MSW, [facility]  
Multiple attempts made to verify PTA medications: unsuccessful.  
PRN Medication Documentation    Specific patient behavior that led to need for PRN medication: Hypertension (BP of 142/102)  Staff interventions attempted prior to PRN being given: calming techniques   PRN medication given: catapres 0.1 mg   Patient response/effectiveness of PRN medication: no further need noted at this time, BP currently 144/99 when rechecked     
PSYCHIATRIC PROGRESS NOTE    Chief Complaint:  \"I am ok.\"    Length of Stay: 3 Days    Interval History:  03/04/2024  Nursing report patient has had many questions, otherwise hasn't been any behavioral issues.  Autumn says that he had many questions about his stay.  He asks about his brother. No SI. No HI. No AVH  No melvi or psychosis.    03/04/2024  Nursing report patient has been sleeping well and eating well. He's accepted his medications.  He has been writing some bizarre notes, which has requests for leaving the hospital.  Autumn had a challenging time getting up this morning. He still refused to participate in this treatment team.  Patient says that he thought about his medications. When he took his medications he felt great.    He says he was on prozac 20mg po qday, which he took for some time. Reportedly he was awake for some time. He says that he didn't feel like sleeping.    Past Medical History:  No past medical history on file.      Follows with prescribed at [facility].    Allergies:  Allergies   Allergen Reactions    Lactose Intolerance (Gi) Nausea And Vomiting      OLANZapine zydis  5 mg Oral Nightly    nicotine  1 patch TransDERmal Daily     Labs:  Lab Results   Component Value Date/Time    WBC 7.7 03/02/2024 01:05 PM    HGB 13.3 03/02/2024 01:05 PM    HCT 39.7 03/02/2024 01:05 PM     03/02/2024 01:05 PM    MCV 87.3 03/02/2024 01:05 PM      Lab Results   Component Value Date/Time     03/02/2024 01:05 PM    K 4.0 03/02/2024 01:05 PM     03/02/2024 01:05 PM    CO2 28 03/02/2024 01:05 PM    BUN 6 03/02/2024 01:05 PM    GLOB 3.7 03/02/2024 01:05 PM    ALT 22 03/02/2024 01:05 PM        Vitals:    03/05/24 1546   BP: (!) 149/105   Pulse: 70   Resp: 16   Temp:    SpO2:       Physical Exam:  Body habitus: Body mass index is 18.69 kg/m².  Musculoskeletal system: normal gait  Tremor - neg  Cog wheeling - neg    Mental Status Exam:  FRED is a 23 y.o. YO male refused to participate in his treatment 
PSYCHIATRIC PROGRESS NOTE    Chief Complaint:  none    Length of Stay: 2 Days    Interval History:  Nursing report patient has been sleeping well and eating well. He's accepted his medications.  He has been writing some bizarre notes, which has requests for leaving the hospital.  Autumn had a challenging time getting up this morning. He still refused to participate in this treatment team.  Patient says that he thought about his medications. When he took his medications he felt great.    He says he was on prozac 20mg po qday, which he took for some time. Reportedly he was awake for some time. He says that he didn't feel like sleeping.    Past Medical History:  No past medical history on file.      Follows with prescribed at [facility].    Allergies:  No Known Allergies     OLANZapine zydis  5 mg Oral Nightly    nicotine  1 patch TransDERmal Daily     Labs:  Lab Results   Component Value Date/Time    WBC 7.7 03/02/2024 01:05 PM    HGB 13.3 03/02/2024 01:05 PM    HCT 39.7 03/02/2024 01:05 PM     03/02/2024 01:05 PM    MCV 87.3 03/02/2024 01:05 PM      Lab Results   Component Value Date/Time     03/02/2024 01:05 PM    K 4.0 03/02/2024 01:05 PM     03/02/2024 01:05 PM    CO2 28 03/02/2024 01:05 PM    BUN 6 03/02/2024 01:05 PM    GLOB 3.7 03/02/2024 01:05 PM    ALT 22 03/02/2024 01:05 PM        Vitals:    03/03/24 1939   BP: (!) 142/91   Pulse: 78   Resp: 16   Temp: 99.5 °F (37.5 °C)   SpO2: 100%      Physical Exam:  Body habitus: Body mass index is 18.69 kg/m².  Musculoskeletal system: normal gait  Tremor - neg  Cog wheeling - neg    Mental Status Exam:  M is a 23 y.o. YO male refused to participate in his treatment team.  Speech has NL rate and volume  Thought process is logical, linear and goal directed.  Thought content is negative for SI or HI.  Denies AVH  No paranoia or delusions  Insight/Judgment are both poor    Assessment and Plan:  Marcell Us meets criteria for a diagnosis of   Mood d/o 
PSYCHOSOCIAL ASSESSMENT  :Patient identifying info:   Marcell Us is a 23 y.o., male admitted 3/2/2024  8:48 PM     Presenting problem and precipitating factors: Pt reported to ED with mother and grandpa due to bizarre behavior per mother. Per mother, pt has demonstrated paranoia and grandiosity and anxious behaviors. Pt is guarded and refused to answer questions. Mother reports pt is no t compliant with medications, has had sleep and appetite disturbances. Pt endorses SI and HI \"sometimes\" but did not elaborate. Pt has hx of PTSD, Bipolar, ADHD.     Mental status assessment: alert ., oriented x's 3 pleasant and compliant with his treatment -     Strengths/Recreation/Coping Skills:safe housing - supportive housing     Collateral information: Mother He signed a ALFREDO for his mother -   Sonam Us  Parent  [phone]    Current psychiatric /substance abuse providers and contact info: [facility]    Previous psychiatric/substance abuse providers and response to treatment: Pt has had previous admissions. Last was TDO at [facility] at age 19.    Family history of mental illness or substance abuse: unknown     Substance abuse history:  THC, Tobacco, ETOH   Social History     Tobacco Use    Smoking status: Every Day     Types: Cigarettes    Smokeless tobacco: Never   Substance Use Topics    Alcohol use: Not on file       History of biomedical complications associated with substance abuse: no     Patient's current acceptance of treatment or motivation for change: voluntary     Family constellation: single, no children     Is significant other involved? No     Describe support system: Mother     Describe living arrangements and home environment: Pt lives with parent     GUARDIAN/POA: No    Guardian Name:     Guardian Contact:     Health issues: no medical issues     Trauma history: yes    Legal issues: no     History of  service: no     Financial status: unknown     Denominational/cultural factors: 
TRANSFER - IN REPORT:    Verbal report received from PIPPA Grant on Marcell Us being received from [facility] for routine progression of patient care      Report consisted of patient's Situation, Background, Assessment and   Recommendations(SBAR).     Information from the following report(s) Nurse Handoff Report was reviewed with the receiving nurse.    Opportunity for questions and clarification was provided.      Assessment completed upon patient's arrival to unit and care assumed.       PIPPA Grant reported transportation has not been set up; she will call and update staff with an ETA.    
effects.  Patient has not been in physical or protective restraints for at least the past 24 hours.    If weapons involved, how are they secured? No weapons involved     Is patient aware of and in agreement with discharge plan? He is aware of discharge and is in agreement     Arrangements for medication:  Prescriptions filled at [facility] for a 30 day supply     Copy of discharge instructions to provider?:  yes     Arrangements for transportation home:  Lyft     Keep all follow up appointments as scheduled, continue to take prescribed medications per physician instructions.  Mental health crisis number:  911 or your local mental health crisis line number at [phone]         Discharge Medication List and Instructions:      Medication List        START taking these medications      nicotine 21 MG/24HR  Commonly known as: NICODERM CQ  Place 1 patch onto the skin daily  Start taking on: March 7, 2024     OLANZapine zydis 5 MG disintegrating tablet  Commonly known as: ZYPREXA  Take 1 tablet by mouth nightly     traZODone 50 MG tablet  Commonly known as: DESYREL  Take 1 tablet by mouth nightly as needed for Sleep            CONTINUE taking these medications      cloNIDine 0.2 MG tablet  Commonly known as: CATAPRES  Take 1 tablet by mouth nightly            STOP taking these medications      FLUoxetine 20 MG capsule  Commonly known as: PROZAC     hydrOXYzine pamoate 25 MG capsule  Commonly known as: VISTARIL               Where to Get Your Medications        These medications were sent to [facility] - [place], [place] - [address] [place] [address] - P [phone] - F [phone]  [address] [place] [address]      Phone: [phone]   cloNIDine 0.2 MG tablet  nicotine 21 MG/24HR  OLANZapine zydis 5 MG disintegrating tablet  traZODone 50 MG tablet         Unresulted Labs (24h ago, onward)      None            To obtain results of studies pending at discharge, please contact [phone]     Follow-up

## 2024-03-06 NOTE — PLAN OF CARE
Problem: Safety - Adult  Goal: Free from fall injury  Outcome: Progressing  Flowsheets (Taken 3/5/2024 2354)  Free From Fall Injury:   Instruct family/caregiver on patient safety   Based on caregiver fall risk screen, instruct family/caregiver to ask for assistance with transferring infant if caregiver noted to have fall risk factors     Problem: Anxiety  Goal: Will report anxiety at manageable levels  Description: INTERVENTIONS:  1. Administer medication as ordered  2. Teach and rehearse alternative coping skills  3. Provide emotional support with 1:1 interaction with staff  Outcome: Progressing  Flowsheets (Taken 3/5/2024 2354)  Will report anxiety at manageable levels:   Administer medication as ordered   Provide emotional support with 1:1 interaction with staff   Teach and rehearse alternative coping skills

## 2024-03-06 NOTE — PLAN OF CARE
Problem: Anxiety  Goal: Will report anxiety at manageable levels  Description: INTERVENTIONS:  1. Administer medication as ordered  2. Teach and rehearse alternative coping skills  3. Provide emotional support with 1:1 interaction with staff  3/6/2024 0809 by Julisa Yadav RN  Outcome: Progressing     Problem: Nutrition Deficit:  Goal: Optimize nutritional status  Outcome: Progressing    Patient awake and alert and present in the milieu.   Patient denies SI at this time.  Patient calm and cooperative on unit.  Interacts well with staff and peers.   Med and meal compliant.    Will continue to monitor q15 minutes for safety checks.    Plan: dc to home via lyft.  Medications filled in [facility] OP.    DISCHARGE SUMMARY from Nurse    The discharge information has been reviewed with the patient.  The patient verbalized understanding.  Discharge medications reviewed with the patient and appropriate educational materials and side effects teaching were provided.

## 2024-03-06 NOTE — PROGRESS NOTES
Behavioral Services  Medicare Certification Upon Admission    I certify that this patient's inpatient psychiatric hospital admission is medically necessary for:    [x] (1) Treatment which could reasonably be expected to improve this patient's condition,       [] (2) Or for diagnostic study;     AND     [x](2) The inpatient psychiatric services are provided while the individual is under the care of a physician and are included in the individualized plan of care.    Estimated length of stay/service 3-5 days    Plan for post-hospital care Outpatient Psychiatry.    Electronically signed by Leobardo Hernández MD on 3/4/2024 at 10:01 AM      
Laboratory Monitoring for Antipsychotics:    This patient is currently prescribed the following medication(s):   Current Facility-Administered Medications: ondansetron (ZOFRAN-ODT) disintegrating tablet 4 mg, 4 mg, Oral, Q8H PRN  loperamide (IMODIUM) capsule 2 mg, 2 mg, Oral, 4x Daily PRN  OLANZapine zydis (ZYPREXA) disintegrating tablet 5 mg, 5 mg, Oral, Nightly  acetaminophen (TYLENOL) tablet 650 mg, 650 mg, Oral, Q4H PRN  polyethylene glycol (GLYCOLAX) packet 17 g, 17 g, Oral, Daily PRN  senna (SENOKOT) tablet 8.6 mg, 1 tablet, Oral, Daily PRN  aluminum & magnesium hydroxide-simethicone (MAALOX) 200-200-20 MG/5ML suspension 30 mL, 30 mL, Oral, Q6H PRN  hydrOXYzine HCl (ATARAX) tablet 50 mg, 50 mg, Oral, TID PRN  haloperidol (HALDOL) tablet 5 mg, 5 mg, Oral, Q4H PRN **OR** haloperidol lactate (HALDOL) injection 5 mg, 5 mg, IntraMUSCular, Q4H PRN  diphenhydrAMINE (BENADRYL) injection 50 mg, 50 mg, IntraMUSCular, Q4H PRN  traZODone (DESYREL) tablet 50 mg, 50 mg, Oral, Nightly PRN  nicotine (NICODERM CQ) 21 MG/24HR 1 patch, 1 patch, TransDERmal, Daily  nicotine polacrilex (COMMIT) lozenge 2 mg, 2 mg, Oral, Q1H PRN    The following labs have been completed for monitoring of antipsychotics and/or mood stabilizers:    Height, Weight, BMI Estimation  Estimated body mass index is 18.69 kg/m² as calculated from the following:    Height as of this encounter: 1.7 m (5' 6.93\").    Weight as of this encounter: 54 kg (119 lb 0.8 oz).     Vital Signs/Blood Pressure  /87   Pulse 58   Temp 99 °F (37.2 °C) (Oral)   Resp 15   Ht 1.7 m (5' 6.93\")   Wt 54 kg (119 lb 0.8 oz)   SpO2 100%   BMI 18.69 kg/m²     Renal Function, Hepatic Function and Chemistry  Estimated Creatinine Clearance: 113 mL/min (based on SCr of 0.79 mg/dL).    Lab Results   Component Value Date/Time     03/02/2024 01:05 PM    K 4.0 03/02/2024 01:05 PM     03/02/2024 01:05 PM    CO2 28 03/02/2024 01:05 PM    BUN 6 03/02/2024 01:05 PM    GLOB 
Spiritual Care Assessment/Progress Note  [facility]    Name: Marcell Us MRN: [id number]    Age: 23 y.o.     Sex: male   Language: English     Date: 3/6/2024            Total Time Calculated: 25 min              Spiritual Assessment begun in [facility]  Service Provided For:: Patient  Referral/Consult From:: Rounding  Encounter Overview/Reason : Behavioral Health    Spiritual beliefs:      [] Involved in a jaron tradition/spiritual practice:      [] Supported by a jaron community:      [] Claims no spiritual orientation:      [] Seeking spiritual identity:           [x] Adheres to an individual form of spirituality:      [] Not able to assess:                Identified resources for coping and support system:   Support System: Family members       [] Prayer                  [] Devotional reading               [] Music                  [] Guided Imagery     [] Pet visits                                        [] Other: (COMMENT)     Specific area/focus of visit   Encounter:    Crisis:    Spiritual/Emotional needs:    Ritual, Rites and Sacraments:    Grief, Loss, and Adjustments:    Ethics/Mediation:    Behavioral Health: Type : Initial Encounter  Palliative Care:    Advance Care Planning:      Plan/Referrals: Other (Comment) (Pt is scheduled for discharge today)    Narrative:       Referral source:  initiated visit due to Marcell Us length of stay [facility]. I reviewed the patient's medical record prior to this encounter.     Assessment: Patient is hopeful for recovery and expressed positive results from this admission    I invited Marcell Us to verbalize him feelings, struggles, fears and hopes for this admission and/or recovery. Together we explored ways he might observe spiritual practices or other coping strategies within the confines of hospital setting.     Outcome:  Marcell Us identified meaningful coping resources, and 
Needs:  Energy Requirements Based On: Kcal/kg  Weight Used for Energy Requirements: Usual  Energy (kcal/day): 1908  Weight Used for Protein Requirements: Current  Protein (g/day): 76  Method Used for Fluid Requirements: 1 ml/kcal  Fluid (ml/day): 1900    Nutrition Related Findings:   Edema:                      Bowel Movement:   3/5/2024    Wounds: Wound Type: None      Anthropometric Measures:  Height: 170 cm (5' 6.93\")  Ideal Body Weight (IBW): 148 lbs (67 kg)       Current Body Weight: 54 kg (119 lb), 80.4 % IBW. Weight Source: Standing Scale  Current BMI (kg/m2): 18.7  Usual Body Weight: 63.5 kg (140 lb)  % Weight Change (Calculated): -15  Weight Adjustment For: No Adjustment                 BMI Categories: Underweight (BMI less than 18.5)    Wt Readings from Last 20 Encounters:   03/02/24 54 kg (119 lb 0.8 oz)   03/02/24 54 kg (119 lb 0.8 oz)   02/03/15 52.8 kg (116 lb 6 oz) (82 %, Z= 0.93)*   05/30/12 40.5 kg (89 lb 6 oz) (89 %, Z= 1.22)*   04/22/12 40.4 kg (89 lb) (90 %, Z= 1.26)*   04/01/11 38.6 kg (85 lb) (95 %, Z= 1.64)*     * Growth percentiles are based on CDC (Boys, 2-20 Years) data.           Nutrition Diagnosis:   Inadequate protein-energy intake related to cognitive or neurological impairment as evidenced by weight loss    Nutrition Interventions:   Food and/or Nutrient Delivery: Continue Current Diet, Discontinue Oral Nutrition Supplement  Nutrition Education/Counseling: No recommendation at this time  Coordination of Nutrition Care: Continue to monitor while inpatient, Interdisciplinary Rounds       Goals:     Goals: PO intake 50% or greater, by next RD assessment       Nutrition Monitoring and Evaluation:   Behavioral-Environmental Outcomes: None Identified  Food/Nutrient Intake Outcomes: Food and Nutrient Intake, Supplement Intake  Physical Signs/Symptoms Outcomes: Biochemical Data, Weight    Discharge Planning:    Continue current diet, Recommend pursue outpatient nutrition counseling     Dequan

## 2024-03-07 VITALS
SYSTOLIC BLOOD PRESSURE: 120 MMHG | DIASTOLIC BLOOD PRESSURE: 83 MMHG | OXYGEN SATURATION: 95 % | HEART RATE: 69 BPM | RESPIRATION RATE: 20 BRPM | WEIGHT: 127 LBS | HEIGHT: 67 IN | TEMPERATURE: 99.9 F | BODY MASS INDEX: 19.93 KG/M2

## 2024-03-07 LAB
ALBUMIN SERPL-MCNC: 4.2 G/DL (ref 3.5–5)
ALBUMIN/GLOB SERPL: 0.8 (ref 1.1–2.2)
ALT SERPL-CCNC: 20 U/L (ref 12–78)
AMPHET UR QL SCN: NEGATIVE
ANION GAP SERPL CALC-SCNC: 3 MMOL/L (ref 5–15)
APPEARANCE UR: CLEAR
AST SERPL-CCNC: 16 U/L (ref 15–37)
BACTERIA URNS QL MICRO: NEGATIVE /HPF
BASOPHILS NFR BLD: 0 % (ref 0–1)
BENZODIAZ UR QL: NEGATIVE
BILIRUB SERPL-MCNC: 0.7 MG/DL (ref 0.2–1)
BILIRUB UR QL: NEGATIVE
BUN/CREAT SERPL: 8 (ref 12–20)
CALCIUM SERPL-MCNC: 10.2 MG/DL (ref 8.5–10.1)
CANNABINOIDS UR QL SCN: POSITIVE
CHLORIDE SERPL-SCNC: 98 MMOL/L (ref 97–108)
CO2 SERPL-SCNC: 30 MMOL/L (ref 21–32)
COCAINE UR QL SCN: NEGATIVE
COLOR UR: ABNORMAL
DIFFERENTIAL METHOD BLD: ABNORMAL
EOSINOPHIL # BLD: 0 K/UL (ref 0–0.4)
EOSINOPHIL NFR BLD: 0 % (ref 0–7)
EPITH CASTS URNS QL MICRO: ABNORMAL /LPF
GLOBULIN SER CALC-MCNC: 5 G/DL (ref 2–4)
GLUCOSE SERPL-MCNC: 96 MG/DL (ref 65–100)
GLUCOSE UR STRIP.AUTO-MCNC: NEGATIVE MG/DL
HCT VFR BLD AUTO: 44.8 % (ref 36.6–50.3)
HGB BLD-MCNC: 14.8 G/DL (ref 12.1–17)
HGB UR QL STRIP: NEGATIVE
IMM GRANULOCYTES # BLD AUTO: 0 K/UL (ref 0–0.04)
IMM GRANULOCYTES NFR BLD AUTO: 0 % (ref 0–0.5)
KETONES UR QL STRIP.AUTO: 15 MG/DL
LIPASE SERPL-CCNC: 20 U/L (ref 13–75)
LYMPHOCYTES # BLD: 0.8 K/UL (ref 0.8–3.5)
Lab: ABNORMAL
MCH RBC QN AUTO: 29.1 PG (ref 26–34)
MCHC RBC AUTO-ENTMCNC: 33 G/DL (ref 30–36.5)
MCV RBC AUTO: 88.2 FL (ref 80–99)
METHADONE UR QL: NEGATIVE
MONOCYTES # BLD: 0.8 K/UL (ref 0–1)
MONOCYTES NFR BLD: 7 % (ref 5–13)
NEUTS SEG # BLD: 9.4 K/UL (ref 1.8–8)
NEUTS SEG NFR BLD: 85 % (ref 32–75)
NITRITE UR QL STRIP.AUTO: NEGATIVE
NRBC # BLD: 0 K/UL (ref 0–0.01)
NRBC BLD-RTO: 0 PER 100 WBC
PCP UR QL: NEGATIVE
PH UR STRIP: 6.5 (ref 5–8)
PLATELET # BLD AUTO: 415 K/UL (ref 150–400)
PMV BLD AUTO: 9.4 FL (ref 8.9–12.9)
POTASSIUM SERPL-SCNC: 4 MMOL/L (ref 3.5–5.1)
PROT SERPL-MCNC: 9.2 G/DL (ref 6.4–8.2)
PROT UR STRIP-MCNC: NEGATIVE MG/DL
RBC # BLD AUTO: 5.08 M/UL (ref 4.1–5.7)
RBC #/AREA URNS HPF: ABNORMAL /HPF (ref 0–5)
SODIUM SERPL-SCNC: 131 MMOL/L (ref 136–145)
SP GR UR REFRACTOMETRY: 1.02
URINE CULTURE IF INDICATED: ABNORMAL
UROBILINOGEN UR QL STRIP.AUTO: 1 EU/DL (ref 0.2–1)
WBC # BLD AUTO: 11 K/UL (ref 4.1–11.1)

## 2024-03-07 PROCEDURE — A4216 STERILE WATER/SALINE, 10 ML: HCPCS | Performed by: EMERGENCY MEDICINE

## 2024-03-07 PROCEDURE — 2580000003 HC RX 258: Performed by: EMERGENCY MEDICINE

## 2024-03-07 PROCEDURE — 6360000002 HC RX W HCPCS: Performed by: EMERGENCY MEDICINE

## 2024-03-07 PROCEDURE — C9113 INJ PANTOPRAZOLE SODIUM, VIA: HCPCS | Performed by: EMERGENCY MEDICINE

## 2024-03-07 PROCEDURE — 96361 HYDRATE IV INFUSION ADD-ON: CPT

## 2024-03-07 PROCEDURE — 96374 THER/PROPH/DIAG INJ IV PUSH: CPT

## 2024-03-07 PROCEDURE — 6370000000 HC RX 637 (ALT 250 FOR IP): Performed by: EMERGENCY MEDICINE

## 2024-03-07 PROCEDURE — 96375 TX/PRO/DX INJ NEW DRUG ADDON: CPT

## 2024-03-07 RX ORDER — ONDANSETRON 4 MG/1
4 TABLET, FILM COATED ORAL EVERY 8 HOURS PRN
Qty: 12 TABLET | Refills: 0 | Status: SHIPPED | OUTPATIENT
Start: 2024-03-07

## 2024-03-07 RX ORDER — PANTOPRAZOLE SODIUM 20 MG/1
20 TABLET, DELAYED RELEASE ORAL DAILY
Qty: 30 TABLET | Refills: 0 | Status: SHIPPED | OUTPATIENT
Start: 2024-03-07

## 2024-03-07 RX ADMIN — DROPERIDOL 0.62 MG: 2.5 INJECTION, SOLUTION INTRAMUSCULAR; INTRAVENOUS at 00:08

## 2024-03-07 RX ADMIN — PANTOPRAZOLE SODIUM 40 MG: 40 INJECTION, POWDER, FOR SOLUTION INTRAVENOUS at 00:09

## 2024-03-07 RX ADMIN — ANTACID/ANTIFLATULENT 1 EACH: 380; 550; 10; 10 GRANULE, EFFERVESCENT ORAL at 00:07

## 2024-03-07 RX ADMIN — SODIUM CHLORIDE 1000 ML: 9 INJECTION, SOLUTION INTRAVENOUS at 00:11

## 2024-03-07 ASSESSMENT — ENCOUNTER SYMPTOMS
VOMITING: 1
NAUSEA: 1
SHORTNESS OF BREATH: 0
ABDOMINAL DISTENTION: 1
DIARRHEA: 0
ABDOMINAL PAIN: 1

## 2024-03-07 NOTE — DISCHARGE INSTRUCTIONS
It was a pleasure taking care of you in our Emergency Department today.  We know that when you come to [facility], you are entrusting us with your health, comfort, and safety.  Our physicians and nurses honor that trust, and truly appreciate the opportunity to care for you and your loved ones.      We also value your feedback.  If you receive a survey about your Emergency Department experience today, please fill it out.  We care about our patients' feedback, and we listen to what you have to say.  Thank you!      Dr. tSephanie Quiñonez MD.

## 2024-03-07 NOTE — ED PROVIDER NOTES
EMERGENCY DEPARTMENT HISTORY AND PHYSICAL EXAM     ----------------------------------------------------------------------------  Please note that this dictation was completed with [employer], the [employer] voice recognition software.  Quite often unanticipated grammatical, syntax, homophones, and other interpretive errors are inadvertently transcribed by the computer software.  Please disregard these errors.  Please excuse any errors that have escaped final proofreading  ----------------------------------------------------------------------------      Date: 3/6/2024  Patient Name: Marcell Us      HISTORY OF PRESENT ILLNESS     Chief Complaint   Patient presents with    Emesis    Abdominal Pain     Pt arrived to ED from home with vomiting and abdominal pain.  Pt was discharged from [facility] for [facility] health today. Pt wants to know if he can take \"Gas X\".       History obtainted from:  Patient    Other independent source of history: Family    HPI: Marcell Us is a 23 y.o. male, with significant pmhx of depression, anxiety, ADHD, who presents via private vehicle to the ED with c/o periumbilical abdominal pain and feeling as though he needs to belch excessively.  Notes that he was recently discharged from [facility] behavioral health unit and is \"detoxing from Paxil currently taking olanzapine, clonidine and prescribed trazodone.  Has not taken his trazodone yet this evening.  Notes that he has this recurring periumbilical pain and distention feeling as though he needs to belch but has never seen GI.  Believes he also has reflux without diagnosis.  Has had episodes of vomiting this evening witnessed by mother.  No recent fever or diarrhea.      PCP: No primary care provider on file.    Allergy List:   Allergies   Allergen Reactions    Lactose Intolerance (Gi) Nausea And Vomiting         Medications:  No current facility-administered medications for this encounter.     Current Outpatient Medications

## 2024-04-10 ENCOUNTER — TRANSCRIBE ORDERS (OUTPATIENT)
Facility: HOSPITAL | Age: 24
End: 2024-04-10

## 2024-04-10 ENCOUNTER — HOSPITAL ENCOUNTER (OUTPATIENT)
Facility: HOSPITAL | Age: 24
Discharge: HOME | End: 2024-04-13

## 2024-04-10 DIAGNOSIS — R10.32 LLQ PAIN: ICD-10-CM

## 2024-04-10 DIAGNOSIS — R10.32 LLQ PAIN: Primary | ICD-10-CM

## 2024-04-10 PROCEDURE — 76700 US EXAM ABDOM COMPLETE: CPT

## 2024-04-10 PROCEDURE — 76856 US EXAM PELVIC COMPLETE: CPT

## 2024-10-22 ENCOUNTER — OFFICE VISIT (OUTPATIENT)
Facility: CLINIC | Age: 24
End: 2024-10-22
Payer: COMMERCIAL

## 2024-10-22 VITALS
OXYGEN SATURATION: 100 % | SYSTOLIC BLOOD PRESSURE: 109 MMHG | DIASTOLIC BLOOD PRESSURE: 71 MMHG | BODY MASS INDEX: 19.81 KG/M2 | TEMPERATURE: 98 F | HEIGHT: 67 IN | HEART RATE: 63 BPM | RESPIRATION RATE: 16 BRPM | WEIGHT: 126.2 LBS

## 2024-10-22 DIAGNOSIS — R10.9 RECURRENT ABDOMINAL PAIN: ICD-10-CM

## 2024-10-22 DIAGNOSIS — Z11.3 SCREENING EXAMINATION FOR STI: ICD-10-CM

## 2024-10-22 DIAGNOSIS — Z00.00 ADULT GENERAL MEDICAL EXAM: Primary | ICD-10-CM

## 2024-10-22 PROCEDURE — 99385 PREV VISIT NEW AGE 18-39: CPT | Performed by: FAMILY MEDICINE

## 2024-10-22 SDOH — ECONOMIC STABILITY: FOOD INSECURITY: WITHIN THE PAST 12 MONTHS, YOU WORRIED THAT YOUR FOOD WOULD RUN OUT BEFORE YOU GOT MONEY TO BUY MORE.: NEVER TRUE

## 2024-10-22 SDOH — ECONOMIC STABILITY: INCOME INSECURITY: HOW HARD IS IT FOR YOU TO PAY FOR THE VERY BASICS LIKE FOOD, HOUSING, MEDICAL CARE, AND HEATING?: VERY HARD

## 2024-10-22 SDOH — HEALTH STABILITY: PHYSICAL HEALTH: ON AVERAGE, HOW MANY MINUTES DO YOU ENGAGE IN EXERCISE AT THIS LEVEL?: 0 MIN

## 2024-10-22 SDOH — ECONOMIC STABILITY: FOOD INSECURITY: WITHIN THE PAST 12 MONTHS, THE FOOD YOU BOUGHT JUST DIDN'T LAST AND YOU DIDN'T HAVE MONEY TO GET MORE.: NEVER TRUE

## 2024-10-22 SDOH — HEALTH STABILITY: PHYSICAL HEALTH: ON AVERAGE, HOW MANY DAYS PER WEEK DO YOU ENGAGE IN MODERATE TO STRENUOUS EXERCISE (LIKE A BRISK WALK)?: 0 DAYS

## 2024-10-22 ASSESSMENT — SOCIAL DETERMINANTS OF HEALTH (SDOH)
HOW OFTEN DO YOU ATTENT MEETINGS OF THE CLUB OR ORGANIZATION YOU BELONG TO?: NEVER
WITHIN THE LAST YEAR, HAVE YOU BEEN KICKED, HIT, SLAPPED, OR OTHERWISE PHYSICALLY HURT BY YOUR PARTNER OR EX-PARTNER?: NO
DO YOU BELONG TO ANY CLUBS OR ORGANIZATIONS SUCH AS CHURCH GROUPS UNIONS, FRATERNAL OR ATHLETIC GROUPS, OR SCHOOL GROUPS?: NO
HOW OFTEN DO YOU ATTEND CHURCH OR RELIGIOUS SERVICES?: NEVER
ARE YOU MARRIED, WIDOWED, DIVORCED, SEPARATED, NEVER MARRIED, OR LIVING WITH A PARTNER?: NEVER MARRIED
HOW OFTEN DO YOU GET TOGETHER WITH FRIENDS OR RELATIVES?: MORE THAN THREE TIMES A WEEK
WITHIN THE LAST YEAR, HAVE TO BEEN RAPED OR FORCED TO HAVE ANY KIND OF SEXUAL ACTIVITY BY YOUR PARTNER OR EX-PARTNER?: NO
WITHIN THE LAST YEAR, HAVE YOU BEEN HUMILIATED OR EMOTIONALLY ABUSED IN OTHER WAYS BY YOUR PARTNER OR EX-PARTNER?: NO
WITHIN THE LAST YEAR, HAVE YOU BEEN AFRAID OF YOUR PARTNER OR EX-PARTNER?: NO
IN A TYPICAL WEEK, HOW MANY TIMES DO YOU TALK ON THE PHONE WITH FAMILY, FRIENDS, OR NEIGHBORS?: MORE THAN THREE TIMES A WEEK

## 2024-10-22 ASSESSMENT — PATIENT HEALTH QUESTIONNAIRE - PHQ9
7. TROUBLE CONCENTRATING ON THINGS, SUCH AS READING THE NEWSPAPER OR WATCHING TELEVISION: NOT AT ALL
9. THOUGHTS THAT YOU WOULD BE BETTER OFF DEAD, OR OF HURTING YOURSELF: NOT AT ALL
6. FEELING BAD ABOUT YOURSELF - OR THAT YOU ARE A FAILURE OR HAVE LET YOURSELF OR YOUR FAMILY DOWN: NOT AT ALL
2. FEELING DOWN, DEPRESSED OR HOPELESS: NOT AT ALL
SUM OF ALL RESPONSES TO PHQ QUESTIONS 1-9: 0
1. LITTLE INTEREST OR PLEASURE IN DOING THINGS: NOT AT ALL
4. FEELING TIRED OR HAVING LITTLE ENERGY: NOT AT ALL
SUM OF ALL RESPONSES TO PHQ QUESTIONS 1-9: 0
5. POOR APPETITE OR OVEREATING: NOT AT ALL
3. TROUBLE FALLING OR STAYING ASLEEP: NOT AT ALL
SUM OF ALL RESPONSES TO PHQ QUESTIONS 1-9: 0
8. MOVING OR SPEAKING SO SLOWLY THAT OTHER PEOPLE COULD HAVE NOTICED. OR THE OPPOSITE, BEING SO FIGETY OR RESTLESS THAT YOU HAVE BEEN MOVING AROUND A LOT MORE THAN USUAL: NOT AT ALL
SUM OF ALL RESPONSES TO PHQ QUESTIONS 1-9: 0
SUM OF ALL RESPONSES TO PHQ QUESTIONS 1-9: 0
SUM OF ALL RESPONSES TO PHQ9 QUESTIONS 1 & 2: 0
1. LITTLE INTEREST OR PLEASURE IN DOING THINGS: NOT AT ALL
2. FEELING DOWN, DEPRESSED OR HOPELESS: NOT AT ALL
SUM OF ALL RESPONSES TO PHQ QUESTIONS 1-9: 0
SUM OF ALL RESPONSES TO PHQ9 QUESTIONS 1 & 2: 0
10. IF YOU CHECKED OFF ANY PROBLEMS, HOW DIFFICULT HAVE THESE PROBLEMS MADE IT FOR YOU TO DO YOUR WORK, TAKE CARE OF THINGS AT HOME, OR GET ALONG WITH OTHER PEOPLE: NOT DIFFICULT AT ALL

## 2024-10-22 ASSESSMENT — ANXIETY QUESTIONNAIRES
3. WORRYING TOO MUCH ABOUT DIFFERENT THINGS: NOT AT ALL
4. TROUBLE RELAXING: NOT AT ALL
IF YOU CHECKED OFF ANY PROBLEMS ON THIS QUESTIONNAIRE, HOW DIFFICULT HAVE THESE PROBLEMS MADE IT FOR YOU TO DO YOUR WORK, TAKE CARE OF THINGS AT HOME, OR GET ALONG WITH OTHER PEOPLE: NOT DIFFICULT AT ALL
7. FEELING AFRAID AS IF SOMETHING AWFUL MIGHT HAPPEN: NOT AT ALL
5. BEING SO RESTLESS THAT IT IS HARD TO SIT STILL: NOT AT ALL
6. BECOMING EASILY ANNOYED OR IRRITABLE: NOT AT ALL
GAD7 TOTAL SCORE: 0
2. NOT BEING ABLE TO STOP OR CONTROL WORRYING: NOT AT ALL
1. FEELING NERVOUS, ANXIOUS, OR ON EDGE: NOT AT ALL

## 2024-10-22 NOTE — PROGRESS NOTES
Chief Complaint   Patient presents with    Establish Care     he is a 24 y.o. year old male who presents for CPE.  Complete Physical Exam Questions:    1.  Do you follow a low fat diet?  yes  2.  Are you up to date on your Tdap (<10 years)?  Unknown  3.  Have you ever had a Pneumovax vaccine (>65)?  Not applicable   PCV13 Not applicable   PPSV23 Not applicable  4.  Have you had Zoster vaccine (>60)? Not applicable  5.  Have you had the HPV - Gardasil (13- 26)? No  6.  Do you follow an exercise program?  No  7.  Do you smoke?  Yes If > 65 and smoker, have you had a abdominal aortic aneurysm ultrasound screen?  Not applicable  8.  Do you consider yourself overweight?  No  9.  Is there a family history of CAD< age 50?  No  10.  Is there a family history of Cancer?  No  11.  Do you know your Cancer risks? Yes  12.  Have you had a colonoscopy?  Not applicable  13. Have you been tested for HIV or other STI's? Yes HIV today(18-64 y/o)?Yes  14.  Have you had an EKG in the last five years(>50)?Not applicable  15.  Have you had a PSA test done this year (50-69)? Not applicable    Other complaints: has had a few episodes of N/V followed by abd pain, thin stools.  Thinks correlates with lactose intake but unsure.    Reviewed and agree with Nurse Note and duplicated in this note.  Reviewed PmHx, RxHx, FmHx, SocHx, AllgHx and updated and dated in the chart.    Family History   Problem Relation Age of Onset    Glaucoma Mother     No Known Problems Father         does not know    No Known Problems Brother     Diabetes Maternal Grandmother     Pancreatic Cancer Maternal Grandfather     Diabetes Maternal Grandfather        Past Medical History:   Diagnosis Date    Bipolar disorder (HCC)       Social History     Socioeconomic History    Marital status: Single     Spouse name: None    Number of children: 0    Years of education: None    Highest education level: High school graduate   Tobacco Use    Smoking status: Every Day     Types:

## 2024-10-23 LAB
ALBUMIN SERPL-MCNC: 4.1 G/DL (ref 3.5–5)
ALBUMIN/GLOB SERPL: 1 (ref 1.1–2.2)
ALP SERPL-CCNC: 102 U/L (ref 45–117)
ALT SERPL-CCNC: 23 U/L (ref 12–78)
ANION GAP SERPL CALC-SCNC: 7 MMOL/L (ref 2–12)
AST SERPL-CCNC: 16 U/L (ref 15–37)
BASOPHILS # BLD: 0 K/UL (ref 0–0.1)
BASOPHILS NFR BLD: 1 % (ref 0–1)
BILIRUB SERPL-MCNC: 0.3 MG/DL (ref 0.2–1)
BUN SERPL-MCNC: 10 MG/DL (ref 6–20)
BUN/CREAT SERPL: 10 (ref 12–20)
CALCIUM SERPL-MCNC: 9.9 MG/DL (ref 8.5–10.1)
CHLORIDE SERPL-SCNC: 105 MMOL/L (ref 97–108)
CO2 SERPL-SCNC: 28 MMOL/L (ref 21–32)
COMMENT:: NORMAL
CREAT SERPL-MCNC: 1.05 MG/DL (ref 0.7–1.3)
DIFFERENTIAL METHOD BLD: NORMAL
EOSINOPHIL # BLD: 0.1 K/UL (ref 0–0.4)
EOSINOPHIL NFR BLD: 1 % (ref 0–7)
ERYTHROCYTE [DISTWIDTH] IN BLOOD BY AUTOMATED COUNT: 13.6 % (ref 11.5–14.5)
GLOBULIN SER CALC-MCNC: 4.1 G/DL (ref 2–4)
GLUCOSE SERPL-MCNC: 76 MG/DL (ref 65–100)
HCT VFR BLD AUTO: 41 % (ref 36.6–50.3)
HGB BLD-MCNC: 13.5 G/DL (ref 12.1–17)
HIV 1+2 AB+HIV1 P24 AG SERPL QL IA: NONREACTIVE
HIV 1/2 RESULT COMMENT: NORMAL
IMM GRANULOCYTES # BLD AUTO: 0 K/UL (ref 0–0.04)
IMM GRANULOCYTES NFR BLD AUTO: 0 % (ref 0–0.5)
LYMPHOCYTES # BLD: 1.6 K/UL (ref 0.8–3.5)
LYMPHOCYTES NFR BLD: 32 % (ref 12–49)
MCH RBC QN AUTO: 29.5 PG (ref 26–34)
MCHC RBC AUTO-ENTMCNC: 32.9 G/DL (ref 30–36.5)
MCV RBC AUTO: 89.7 FL (ref 80–99)
MONOCYTES # BLD: 0.5 K/UL (ref 0–1)
MONOCYTES NFR BLD: 11 % (ref 5–13)
NEUTS SEG # BLD: 2.7 K/UL (ref 1.8–8)
NEUTS SEG NFR BLD: 55 % (ref 32–75)
NRBC # BLD: 0 K/UL (ref 0–0.01)
NRBC BLD-RTO: 0 PER 100 WBC
PLATELET # BLD AUTO: 305 K/UL (ref 150–400)
PMV BLD AUTO: 10 FL (ref 8.9–12.9)
POTASSIUM SERPL-SCNC: 3.8 MMOL/L (ref 3.5–5.1)
PROT SERPL-MCNC: 8.2 G/DL (ref 6.4–8.2)
RBC # BLD AUTO: 4.57 M/UL (ref 4.1–5.7)
RPR SER QL: NONREACTIVE
SODIUM SERPL-SCNC: 140 MMOL/L (ref 136–145)
SPECIMEN HOLD: NORMAL
T4 FREE SERPL-MCNC: 1 NG/DL (ref 0.8–1.5)
TSH SERPL DL<=0.05 MIU/L-ACNC: 2.94 UIU/ML (ref 0.36–3.74)
WBC # BLD AUTO: 4.9 K/UL (ref 4.1–11.1)

## 2024-10-26 LAB
C TRACH RRNA SPEC QL NAA+PROBE: NEGATIVE
N GONORRHOEA RRNA SPEC QL NAA+PROBE: NEGATIVE
SPECIMEN SOURCE: NORMAL
T VAGINALIS RRNA SPEC QL NAA+PROBE: NEGATIVE